# Patient Record
(demographics unavailable — no encounter records)

---

## 2024-10-22 NOTE — ASSESSMENT
[FreeTextEntry1] : Carotid Doppler August 30, 2024 showed no plaque or stenosis bilaterally.  Echocardiogram August 30, 2024 demonstrated left ventricle normal size and function with ejection fraction of 55 to 60%.  Mild tricuspid and pulmonic regurgitation.  Nuclear stress test October 11, 2024 during which the patient exercised via a Ed protocol for 10 minutes and 20 seconds, totaling 11 METS.  Peak heart rate achieved was 171 bpm, representing 101% of age-predicted maximum.  Blood pressure response to exercise was normal.  EKG showed horizontal ST depressions in the inferior and anterolateral leads with exercise.  Of more importance were the nuclear images which showed no evidence of ischemia or infarct and ejection fraction of 44%.  51-year-old man with a past medical history of dyslipidemia, strong family history of premature CAD who presented to me for evaluation given his family history and an abnormal EKG.  Cardiac testing is mostly unremarkable.  Carotid shows no significant disease.  Echocardiogram showed a normal EF and mild valve disease.  Stress testing shows no evidence of ischemia or infarct and a good exercise capacity.  EF was a little low on stress testing but this was more likely a false finding given the findings of his echo and otherwise normal study.  I do not believe any further workup is needed at this time.  Have encouraged him to continue to be active and exercise more as well as to clean up his diet.  He never had blood work done and we will do this to see if we need to increase his statin.  Given his poor diet recently and hopefully changing it now if his numbers are more borderline we may be able to wait additional time to see if it gets better.

## 2024-10-22 NOTE — HISTORY OF PRESENT ILLNESS
[FreeTextEntry1] : Patient has back to the office today feeling very well and offering no complaints.  He says he has not been eating a good diet because his kitchen was being renovated.  He has been trying to exercise with running outside but says he should be doing that more.  He does not reporting symptoms when he does it.  Patient denies chest pain, shortness of breath, palpitations, orthopnea, presyncope, syncope.

## 2024-10-22 NOTE — DISCUSSION/SUMMARY
[FreeTextEntry1] : 1.  No additional cardiac testing at this time. 3.  Continue atorvastatin at 20 mg daily for now.  Repeat blood work and likely increase the dose if his LDL is over 100. 3.  Patient is encouraged to exercise at least 30 minutes a day every day of the week. 4.  Patient encouraged to work on a healthy diet high in lean protein, whole grains and vegetables, and lower in white flour and simple sugars. 5.  Follow up here in one year or as needed.

## 2024-11-06 NOTE — HEALTH RISK ASSESSMENT
[No] : In the past 12 months have you used drugs other than those required for medical reasons? No [0] : 2) Feeling down, depressed, or hopeless: Not at all (0) [PHQ-2 Negative - No further assessment needed] : PHQ-2 Negative - No further assessment needed [DOV1[place]] : 0 [Never] : Never

## 2024-11-06 NOTE — HISTORY OF PRESENT ILLNESS
[FreeTextEntry1] : hospital discharge follow up [de-identified] : Mr. MINISTERIO TYSON is a 51 year male  comes to the office for hospital discharge follow up.  On 11/05/24 patient was having blurry vision and headaches, went to [facility] ER, CT head was negative. Patient was discharged home same day.  In office today patient is asymptomatic at time of visit.

## 2024-11-06 NOTE — PLAN
[FreeTextEntry1] : Blurry vision- patient will follow with Ophthalmologist on 11/08/24  HLD- lifestyle modifications recommended will retest lipids   Prior to appointment and during encounter with patient extensive medical records were reviewed including but not limited to, Hospital records, out patient records, laboratory data and microbiology data In addition extensive time was also spent in reviewing diagnostic studies.   Total encounter total time 30 mins >50% of time spent counseling/coordinating care  Counseling included abnormal lab results, differential diagnoses, treatment options, risks and benefits, lifestyle changes, current condition, medications, and dose adjustments.  The patient was interactive, attentive, asked questions, and verbalized understanding

## 2025-07-24 NOTE — PLAN
[FreeTextEntry1] : In regards to patient's neck pain, likely musculoskeletal, will prescribe Tizanidine 4 PO QHS PRN. Patient advised to stretch and apply heat as needed. Patient referred to pain management.   HLD- patient will continue Atorvastatin 20mg PO QD  Prior to appointment and during encounter with patient extensive medical records were reviewed including but not limited to, Hospital records, out patient records, laboratory data and microbiology data In addition extensive time was also spent in reviewing diagnostic studies.   Total encounter total time 30 mins >50% of time spent counseling/coordinating care  Counseling included abnormal lab results, differential diagnoses, treatment options, risks and benefits, lifestyle changes, current condition, medications, and dose adjustments.  The patient was interactive, attentive, asked questions, and verbalized understanding

## 2025-07-25 NOTE — REVIEW OF SYSTEMS
[Fever] : no fever [Chills] : no chills [Chest Pain] : no chest pain [Shortness Of Breath] : no shortness of breath [FreeTextEntry9] : +neck pain

## 2025-07-25 NOTE — PHYSICAL EXAM
[FreeTextEntry1] : Constitutional: In NAD, calm and cooperative Heart: well perfused Lungs: nonlabored breathing MSK (Neck): Inspection: no gross swelling identified Palpation: Tenderness of the bilateral lower cervical paraspinals, upper traps, levator scap, focal areas of hyperirritable, palpable, taut bands of muscles that reproduce pain referral pattern and twitch response with palpation ROM: Pain at end cervical extension rotation  Strength: 5/5 strength in bilateral upper extremities Reflexes: 2+ Biceps/Brachioradialis/patella/Achilles reflex bilaterally, Hoffmans/Clonus negative bilaterally Sensation: Intact to light touch in bilateral upper extremities Special tests: Spurlings test negative bilaterally Facet Loading: negative bilaterally

## 2025-07-25 NOTE — HISTORY OF PRESENT ILLNESS
[FreeTextEntry1] : Mr. MINISTERIO TYSON is a 52 year male who presents with neck pain  PMH HLD     HPI  07/25/2025 Location: neck and upper back/shoulders Onset: chronic for many years, worse 2 months after receiving a deep tissue massage.  Provocation/Palliative: Pain is worse with neck extension and rotation  Quality: stiff, achy Radiation: non-radiating  Severity:  7/10   Denies any associated numbness. Denies any associated leg weakness. Denies any loss of bowel/bladder control or any groin numbness.   Current pain medications: tizanidine 4mg qhs - rx by PCP yesterday    Previous medications trialed:   Previous procedures relevant to complaint: Injections: none for spine or joints  Surgery: none for spine or joints    Conservative therapy tried: Physical Therapy: none HEP: light aerobic exercise and weight training at the gym Acupuncture: + completed in the past for lower back, temporary improvement      Diagnostic studies reviewed by me: none

## 2025-07-25 NOTE — ASSESSMENT
[FreeTextEntry1] : Mr. MINISTERIO TYSON is a 52 year male who presents with neck pain. No focal deficits on exam. Reviewed with the patient that likely etiology is due to cervical myofascial pain. Would recommend maximizing conservative management at this time.     Impression: neck pain  cervical myofascial pain   Plan:  - PCP notes reviewed  - XR C spine ordered - PT referral provided, emphasized on compliance to HEP. - continue acupuncture - agree with trial of tizanidine rx by PCP - Start Meloxicam 15 mg PO QD [id number], then QDPRN, rx sent, recommend to take with food.  Denies CKD, CAD, or gastritis.  Recommend that if patient develops GI symptoms including abdominal pain, nausea, or vomiting to discontinue use of medication immediately. - if pain does not improve, will consider TPI at next visit.  - RTC in 4-6 weeks    The patient expressed verbal understanding and is in agreement with the plan of care. All of the patient's questions and concerns were addressed during today's visit.